# Patient Record
Sex: MALE | Race: WHITE
[De-identification: names, ages, dates, MRNs, and addresses within clinical notes are randomized per-mention and may not be internally consistent; named-entity substitution may affect disease eponyms.]

---

## 2023-05-31 ENCOUNTER — HOSPITAL ENCOUNTER (EMERGENCY)
Dept: HOSPITAL 97 - ER | Age: 23
Discharge: HOME | End: 2023-05-31
Payer: COMMERCIAL

## 2023-05-31 VITALS — SYSTOLIC BLOOD PRESSURE: 145 MMHG | OXYGEN SATURATION: 99 % | TEMPERATURE: 99 F | DIASTOLIC BLOOD PRESSURE: 89 MMHG

## 2023-05-31 DIAGNOSIS — R00.2: Primary | ICD-10-CM

## 2023-05-31 DIAGNOSIS — Z88.6: ICD-10-CM

## 2023-05-31 DIAGNOSIS — Z88.5: ICD-10-CM

## 2023-05-31 LAB
BUN BLD-MCNC: 16 MG/DL (ref 7–18)
GLUCOSE SERPLBLD-MCNC: 110 MG/DL (ref 74–106)
HCT VFR BLD CALC: 43.9 % (ref 39.6–49)
INR BLD: 1.01
LYMPHOCYTES # SPEC AUTO: 1.8 K/UL (ref 0.7–4.9)
MAGNESIUM SERPL-MCNC: 2.1 MG/DL (ref 1.6–2.4)
MCV RBC: 86.4 FL (ref 80–100)
METHAMPHET UR QL SCN: NEGATIVE
PMV BLD: 8.2 FL (ref 7.6–11.3)
POTASSIUM SERPL-SCNC: 3.9 MEQ/L (ref 3.5–5.1)
RBC # BLD: 5.08 M/UL (ref 4.33–5.43)
THC SERPL-MCNC: NEGATIVE NG/ML
TROPONIN I SERPL HS-MCNC: 5.7 PG/ML (ref ?–58.9)
TSH SERPL DL<=0.05 MIU/L-ACNC: 1.42 UIU/ML (ref 0.36–3.74)

## 2023-05-31 PROCEDURE — 84484 ASSAY OF TROPONIN QUANT: CPT

## 2023-05-31 PROCEDURE — 36415 COLL VENOUS BLD VENIPUNCTURE: CPT

## 2023-05-31 PROCEDURE — 80048 BASIC METABOLIC PNL TOTAL CA: CPT

## 2023-05-31 PROCEDURE — 80307 DRUG TEST PRSMV CHEM ANLYZR: CPT

## 2023-05-31 PROCEDURE — 71045 X-RAY EXAM CHEST 1 VIEW: CPT

## 2023-05-31 PROCEDURE — 93005 ELECTROCARDIOGRAM TRACING: CPT

## 2023-05-31 PROCEDURE — 84481 FREE ASSAY (FT-3): CPT

## 2023-05-31 PROCEDURE — 99283 EMERGENCY DEPT VISIT LOW MDM: CPT

## 2023-05-31 PROCEDURE — 84439 ASSAY OF FREE THYROXINE: CPT

## 2023-05-31 PROCEDURE — 85610 PROTHROMBIN TIME: CPT

## 2023-05-31 PROCEDURE — 83735 ASSAY OF MAGNESIUM: CPT

## 2023-05-31 PROCEDURE — 85379 FIBRIN DEGRADATION QUANT: CPT

## 2023-05-31 PROCEDURE — 85025 COMPLETE CBC W/AUTO DIFF WBC: CPT

## 2023-05-31 PROCEDURE — 84443 ASSAY THYROID STIM HORMONE: CPT

## 2023-05-31 NOTE — RAD REPORT
EXAM DESCRIPTION:  [place] Single View5/31/2023 3:40 pm

 

CLINICAL HISTORY:  PALPITATIONS

 

COMPARISON:  CHEST PA AND LAT 2 VIEW dated 12/15/2015

 

TECHNIQUE:   Portable AP view of the chest.

 

FINDINGS:  The lungs show no focal consolidation. Streaky opacities at the right apex, with suggestio
n of a suture line, may relate to postoperative changes.  No pneumothorax or effusion. The cardiomedi
astinal contours are unremarkable.

 

IMPRESSION:  No acute cardiopulmonary process.

## 2023-05-31 NOTE — ER
Nurse's Notes                                                                                     

 [facility]                                                                 

Name: José Miguel Garcia                                                                               

Age: 22 yrs                                                                                       

Sex: Male                                                                                         

: 2000                                                                                   

MRN: [id number]                                                                                   

Arrival Date: 2023                                                                          

Time: 14:57                                                                                       

Account#: [id number]                                                                            

Bed 12                                                                                            

Private MD: Sergei Vazquez                                                                         

Diagnosis: Palpitations                                                                           

                                                                                                  

Presentation:                                                                                     

                                                                                             

15:04 Chief complaint: Patient states: "I've had heart palpitations on and off for the past   mb9 

      week at night. I don't have SOB or chest pain. This has happened a couple times over        

      the past 5 or 6 years. I have an irregular-regular heart beat". Coronavirus screen:         

      Vaccine status:. Coronavirus screen: Vaccine status: Patient reports receiving the 2nd      

      dose of the covid vaccine. Ebola Screen: No symptoms or risks identified at this time.      

      Initial Sepsis Screen: Does the patient meet any 2 criteria? No. Patient's initial          

      sepsis screen is negative. Does the patient have a suspected source of infection? No.       

      Patient's initial sepsis screen is negative. Risk Assessment: Do you want to hurt           

      yourself or someone else? Patient reports no desire to harm self or others. Onset of        

      symptoms was May 31, 2023.                                                                  

15:04 Method Of Arrival: Ambulatory                                                           mb9 

15:04 Acuity: TRACEY 3                                                                           mb9 

                                                                                                  

Triage Assessment:                                                                                

15:09 General: Appears in no apparent distress. Behavior is anxious. Pain: Denies pain.       mb9 

      Neuro: Ch Agitation-Sedation Scale (RASS): 0 - Alert and Calm Level of                

      Consciousness is awake, alert, obeys commands, Oriented to person, place, time,             

      situation, Appropriate for age. Cardiovascular: Denies chest pain, nausea,                  

      palpitations, shortness of breath. Respiratory: Airway is patent Respiratory effort is      

      even, unlabored, Respiratory pattern is regular, symmetrical. Derm: Skin is pink, warm      

      \T\ dry. Musculoskeletal: Range of motion: intact in all extremities.                       

                                                                                                  

Historical:                                                                                       

- Allergies:                                                                                      

15:07 Morphine;                                                                               mb9 

15:07 Norco;                                                                                  mb9 

15:07 Ibuprofen;                                                                              mb9 

- Home Meds:                                                                                      

15:07 None [Active];                                                                          mb9 

- PMHx:                                                                                           

15:07 Spontaneous pneumothorax X 8;                                                           mb9 

- PSHx:                                                                                           

15:07 Chest tubes;                                                                            mb9 

                                                                                                  

- Immunization history:: Adult Immunizations up to date.                                          

- Social history:: Smoking status: Patient denies any tobacco usage or history of.                

                                                                                                  

                                                                                                  

Screenin:04 [facility] ED Fall Risk Assessment (Adult) History of falling in the last 3 months,       iw  

      including since admission. Abuse screen: Denies threats or abuse. Denies injuries from      

      another. Nutritional screening: No deficits noted. Tuberculosis screening: No symptoms      

      or risk factors identified.                                                                 

                                                                                                  

Assessment:                                                                                       

15:09 Reassessment: pt denies caffenine use. Pt states he started taking Bucked up pre        mb9 

      workout 1 month ago. Denies any use today.                                                  

16:04 Reassessment: Patient appears in no apparent distress at this time. Patient and/or      iw  

      family updated on plan of care and expected duration. Pain level reassessed. Patient is     

      alert, oriented x 3, equal unlabored respirations, skin warm/dry/pink.                      

                                                                                                  

Vital Signs:                                                                                      

15:04  / 89; Pulse 81; Resp 18; Temp 99(O); Pulse Ox 99% on R/A; Weight 83.91 kg;       mb9 

      Height 5 ft. 11 in. ;                                                                       

15:04 Body Mass Index 25.80 (83.91 kg, 180.34 cm)                                             mb9 

                                                                                                  

ED Course:                                                                                        

15:00 Patient arrived in ED.                                                                  mr  

15:01 Sergei Vazquez,  is Private Physician.                                                 mr  

15:02 [facility]                                                                cp  

15:03 Pablo Gentile MD is Attending Physician.                                              cp  

15:03 Pablo Gentile MD is Attending Physician.                                              cp  

15:04 Arm band placed on.                                                                     mb9 

15:07 Triage completed.                                                                       mb9 

15:23 Basic Metabolic Panel Sent.                                                             bc6 

15:23 CBC with Diff Sent.                                                                     bc6 

15:23 D-Dimer Sent.                                                                           bc6 

15:23 Magnesium Sent.                                                                         bc6 

15:23 PT-INR Sent.                                                                            bc6 

15:23 Troponin HS Sent.                                                                       bc6 

15:23 T4 Free Sent.                                                                           bc6 

15:23 T3 Free Sent.                                                                           bc6 

15:23 TSH Sent.                                                                               bc6 

15:24 Inserted saline lock: 20 gauge in left antecubital area, using aseptic technique.       bc6 

15:42 XRAY Chest (1 view) In Process Unspecified.                                             EDMS

16:04 Heidi Peters, RN is Primary Nurse.                                                   iw  

16:30 Patient has correct armband on for positive identification.                             iw  

16:36 Jaron Diana MD is Referral Physician.                                               cp  

                                                                                                  

Administered Medications:                                                                         

No medications were administered                                                                  

                                                                                                  

                                                                                                  

Medication:                                                                                       

16:30 VIS not applicable for this client.                                                     iw  

                                                                                                  

Outcome:                                                                                          

16:37 Discharge ordered by MD. perea  

17:11 Patient left the ED.                                                                    iw  

                                                                                                  

Signatures:                                                                                       

Dispatcher [employer]                           Marie Hernandez Irene RN                     RN   Jim Hardwick PA PA cp Breneman, Mary Beth, RN                 RN   mb9                                                  

Alka Mora                           bc6                                                  

                                                                                                  

**************************************************************************************************

## 2023-05-31 NOTE — EDPHYS
Physician Documentation                                                                           

 [facility]                                                                 

Name: José Miguel Garcia                                                                               

Age: 22 yrs                                                                                       

Sex: Male                                                                                         

: 2000                                                                                   

MRN: [id number]                                                                                   

Arrival Date: 2023                                                                          

Time: 14:57                                                                                       

Account#: [id number]                                                                            

Bed 12                                                                                            

Private MD: [facility]                                                                         

ED Physician Pablo Gentile                                                                       

HPI:                                                                                              

                                                                                             

15:12 This 22 yrs old Male presents to ER via Ambulatory with complaints of Palpitations.     cp  

15:12 The patient presents with a history of irregular heart beat, heart racing.              cp  

15:12 Context: The symptoms occur at rest, while laying in bed at night.                      cp  

15:12 Onset: The symptoms/episode began/occurred 1 week(s) ago. Duration: The patient or      cp  

      guardian reports multiple episodes, that are intermittent. Associated signs and             

      symptoms: Pertinent negatives: chest pain, cough, fever, SOB, syncope, vomiting.            

      Severity of symptoms: in the emergency department the symptoms have improved markedly.      

                                                                                                  

Historical:                                                                                       

- Allergies:                                                                                      

15:07 Morphine;                                                                               mb9 

15:07 Norco;                                                                                  mb9 

15:07 Ibuprofen;                                                                              mb9 

- Home Meds:                                                                                      

15:07 None [Active];                                                                          mb9 

- PMHx:                                                                                           

15:07 Spontaneous pneumothorax X 8;                                                           mb9 

- PSHx:                                                                                           

15:07 Chest tubes;                                                                            mb9 

                                                                                                  

- Immunization history:: Adult Immunizations up to date.                                          

- Social history:: Smoking status: Patient denies any tobacco usage or history of.                

                                                                                                  

                                                                                                  

ROS:                                                                                              

15:15 Constitutional: Negative for body aches, chills, fever, poor PO intake.                 cp  

15:15 Eyes: Negative for injury, pain, redness, and discharge.                                cp  

15:15 ENT: Negative for drainage from ear(s), ear pain, sore throat, difficulty swallowing,   cp  

      difficulty handling secretions.                                                             

15:15 Cardiovascular: Positive for palpitations, Negative for chest pain, edema.                  

15:15 Respiratory: Negative for cough, shortness of breath, wheezing.                             

15:15 Abdomen/GI: Negative for abdominal pain, nausea, vomiting, and diarrhea.                    

15:15 Neuro: Negative for altered mental status, dizziness, headache, weakness.                   

15:15 All other systems are negative.                                                             

                                                                                                  

Exam:                                                                                             

15:20 ECG was reviewed by the Attending Physician.                                            cp  

15:21 Constitutional: The patient appears in no acute distress, alert, awake, comfortable,    cp  

      non-toxic, well developed, well nourished.                                                  

15:21 Head/Face:  Normocephalic, atraumatic.                                                  cp  

15:21 Eyes: Periorbital structures: appear normal, Conjunctiva: normal, no exudate, no        cp  

      injection, Sclera: no appreciated abnormality, Lids and lashes: appear normal,              

      bilaterally.                                                                                

15:21 ENT: External ear(s): are unremarkable, Nose: is normal, Mouth: Lips: moist, Oral           

      mucosa: pink and intact, moist, Posterior pharynx: is normal, airway is patent, no          

      erythema, no exudate.                                                                       

15:21 Neck: ROM/movement: is normal, is supple, without pain, no range of motions limitations.    

15:21 Chest/axilla: Inspection: normal.                                                           

15:21 Cardiovascular: Rate: normal, Rhythm: regular, Heart sounds: murmur, not appreciated,       

      Edema: is not appreciated, JVD: is not appreciated.                                         

15:21 Respiratory: the patient does not display signs of respiratory distress,  Respirations:     

      normal, no use of accessory muscles, no retractions, labored breathing, is not present,     

      Breath sounds: are clear throughout, no decreased breath sounds, no stridor, no             

      wheezing.                                                                                   

15:21 Abdomen/GI: Inspection: abdomen appears normal, Bowel sounds: active, all quadrants,        

      Palpation: abdomen is soft and non-tender, in all quadrants.                                

15:21 Back: pain, is absent, ROM is normal.                                                       

15:21 Neuro: Orientation: to person, place \T\ time. Mentation: is normal, Motor: moves all       

      fours, strength is normal, Sensation: is normal, Gait: is steady, at a normal pace,         

      without difficulty.                                                                         

                                                                                                  

Vital Signs:                                                                                      

15:04  / 89; Pulse 81; Resp 18; Temp 99(O); Pulse Ox 99% on R/A; Weight 83.91 kg;       mb9 

      Height 5 ft. 11 in. ;                                                                       

15:04 Body Mass Index 25.80 (83.91 kg, 180.34 cm)                                             mb9 

                                                                                                  

MDM:                                                                                              

15:11 Patient medically screened.                                                             cp  

15:30 Differential diagnosis: arrythmia, dehydration, stress disorder, anxiety, electrolyte   cp  

      abnormality.                                                                                

16:36 Data reviewed: vital signs, nurses notes, lab test result(s), EKG, radiologic studies,  cp  

      plain films.                                                                                

16:36 Independent interpretation of the following test(s) in the Emergency Department EKG:    cp  

      See my EKG interpretation above X-Ray: My interpretation is chest image negative for        

      infiltrates. Test considered but Not performed: CT: chest. Counseling: I had a detailed     

      discussion with the patient and/or guardian regarding: the historical points, exam          

      findings, and any diagnostic results supporting the discharge/admit diagnosis, lab          

      results, radiology results, the need for outpatient follow up, a cardiologist, to           

      return to the emergency department if symptoms worsen or persist or if there are any        

      questions or concerns that arise at home.                                                   

                                                                                                  

                                                                                             

15:10 Order name: Basic Metabolic Panel; Complete Time: 16:04                                   

                                                                                             

16:04 Interpretation: Normal except: ; GLUC 110.                                          

                                                                                             

15:10 Order name: CBC with Diff; Complete Time: 16:23                                           

                                                                                             

16:23 Interpretation: Reviewed.                                                                 

                                                                                             

15:10 Order name: D-Dimer; Complete Time: 16:23                                                 

                                                                                             

16:24 Interpretation: Reviewed.                                                                 

                                                                                             

15:10 Order name: Magnesium; Complete Time: 16:04                                               

                                                                                             

15:10 Order name: PT-INR; Complete Time: 16:23                                                  

                                                                                             

16:24 Interpretation: Reviewed.                                                                 

                                                                                             

15:10 Order name: Troponin HS; Complete Time: 16:04                                             

                                                                                             

16:24 Interpretation: Reviewed.                                                                 

                                                                                             

15:10 Order name: TSH; Complete Time: 16:04                                                     

                                                                                             

15:10 Order name: UDS; Complete Time: 16:29                                                     

                                                                                             

15:10 Order name: T3 Free; Complete Time: 16:04                                                 

                                                                                             

15:10 Order name: T4 Free; Complete Time: 16:04                                                 

                                                                                             

15:10 Order name: XRAY Chest (1 view); Complete Time: 16:04                                     

                                                                                             

16:04 Interpretation: Report review.                                                            

                                                                                             

15:10 Order name: EKG; Complete Time: 15:11                                                     

                                                                                             

15:10 Order name: Cardiac monitoring; Complete Time: 16:42                                      

                                                                                             

15:10 Order name: EKG - Nurse/Tech; Complete Time: 15:17                                        

                                                                                             

15:10 Order name: IV Saline Lock; Complete Time: 15:17                                          

                                                                                             

15:10 Order name: Labs collected and sent; Complete Time: 15:17                                 

                                                                                             

15:10 Order name: O2 Per Protocol; Complete Time: 16:02                                       cp  

                                                                                             

15:10 Order name: O2 Sat Monitoring; Complete Time: 16:02                                     cp  

                                                                                                  

ECG:                                                                                              

15:20 Rate is 85 beats/min. Rhythm is regular. [place] interval is normal. QRS interval is normal. cp  

      QT interval is normal. T waves are Inverted in lead aVR. Interpreted by me. Reviewed by     

      me.                                                                                         

                                                                                                  

Administered Medications:                                                                         

No medications were administered                                                                  

                                                                                                  

                                                                                                  

Disposition Summary:                                                                              

23 16:37                                                                                    

Discharge Ordered                                                                                 

      Location: Home                                                                          cp  

      Problem: new                                                                            cp  

      Symptoms: have improved                                                                 cp  

      Condition: Stable                                                                       cp  

      Diagnosis                                                                                   

        - Palpitations                                                                        cp  

      Followup:                                                                               cp  

        - With: Jaron Diana MD                                                                 

        - When: 2 - 3 days                                                                         

        - Reason: Recheck today's complaints                                                       

      Discharge Instructions:                                                                     

        - Discharge Summary Sheet                                                             cp  

        - Palpitations                                                                        cp  

        - Ambulatory Cardiac Monitoring                                                       cp  

      Forms:                                                                                      

        - Work release form                                                                   iw  

        - Medication Reconciliation Form                                                      cp  

        - Thank You Letter                                                                    cp  

        - Antibiotic Education                                                                cp  

        - Prescription Opioid Use                                                             cp  

Signatures:                                                                                       

Dispatcher [employer]                           EDJim Buchanan PA PA cp Breneman, Mary Beth, RN                 RN   mb9                                                  

                                                                                                  

**************************************************************************************************

## 2023-05-31 NOTE — XMS REPORT
Continuity of Care Document

                             Created on:May 31, 2023



Patient:BROCK URIAS

Sex:Male

:2000

External Reference #:[id number]





Demographics







                          Address                   [address] [place] [address]

 

                          Home Phone                [phone]

 

                          Work Phone                [phone]

 

                          Mobile Phone              [phone]

 

                          Email Address             [email or web address]

 

                          Preferred Language        en

 

                          Marital Status            Unknown

 

                          Caodaism Affiliation     Unknown

 

                          Race                      Unknown

 

                          Ethnic Group              Unknown









Author







                          Organization              [facility]

t

 

                          Address                   [address]



                                                    [place] [address]

 

                          Phone                     [phone]









Support







                Name            Relationship    Address         Phone

 

                Brock Urias  Unavailable     [address]    [phone]



                                                [place] [address] 

 

                BROCK URIAS  Unavailable     [address]     [phone]



                                                [place] [address] 









Care Team Providers







                    Name                Role                Phone

 

                    Sergei Vazquez     Attending Clinician Unavailable









Payers







           Payer Name Policy Type Policy Number Effective Date Expiration Date S

laura

 

           AETNA      53         [id number]  2021            [facility] 

-



                                            00:00:00              [facility]

 

           AETNA      53         [id number]            [facility] 

-



                                            00:00:00              [facility]

 

           AETNA      53         [id number]  2021-02-10            [facility] 

-



                                            00:00:00              [facility]







Problems







       Condition Condition Condition Status Onset  Resolution Last   Treating Co

mments 

Source



       Name   Details Category        Date   Date   Treatment Clinician        



                                                 Date                 

 

       [id number] GERD   Problem                                           [facility]



              without                                                  [facility]



              esophagiti                                                  - [facility]



              s                                                       [facility]

 

       [id number] Pain in Problem                                           Comm

on



       93752  left lower                                                  [facility]



              leg                                                     [facility]

 

       [id number] Mixed  Problem                                           [facility]



              hyperlipid                                                  [facility]



              emia                                                    [facility]

 

       [id number] Leukocytos Problem                                           Co

mmon



              is,                                                     [facility]



              unspecifie                                                  - [facility]



              d type                                                  [facility]







Allergies, Adverse Reactions, Alerts







       Allergy Allergy Status Severity Reaction(s) Onset  Inactive Treating Comm

ents 

Source



       Name   Type                        Date   Date   Clinician        

 

       morphine morphine Active        Rash                               [facility]



                                                                      [facility]



                                                                      [facility]

 

       65     Drug   Active        Nausea,                             [facility]



              allergy               upset                              [facility]



                                   stomach                             [facility]







Social History







           Social Habit Start Date Stop Date  Quantity   Comments   Source

 

           History of Tobacco Use                                             Co

mmon [facility]

 

           Sex Assigned At Birth                                             [facility]









                Smoking Status  Start Date      Stop Date       Source

 

                Never Smoker                                    [facility]







Medications







       Ordered Filled Start  Stop   Current Ordering Indication Dosage Frequency

 Signature

                    Comments            Components          Source



     Medication Medication Date Date Medication? Clinician                (SIG) 

          



     Name Name                                                   

 

     Melva Pereirayci -0 2- No                  QD   Azithromyc   

        



     n 250 MG n 250 MG                           in 250 MG           



               00:00: 00:00                                         



               00   :00                                          

 

     Azithromyci Azithromyci -0 2- No                  QD   Azithromyc   

        



     n 250 MG n 250 MG                           in 250 MG           



               00:00: 00:00                                         



               00   :00                                          

 

     Azithromyci Azithromyci -0 2- No                  QD   Azithromyc   

        



     n 250 MG n 250 MG                           in 250 MG           



               00:00: 00:00                                         



               00   :00                                          

 

     Azithromyci Azithromyci -0 2- No                  QD   Azithromyc   

        



     n 250 MG n 250 MG                           in 250 MG           



               00:00: 00:00                                         



               00   :00                                          

 

     Benzonatate Benzonatate -0 2- No             1{capsu      Benzonatat

           



     200  MG 9-21 10-05                le_as_n      e 200 MG           



               00:00: 00:00                eeded}                     



               00   :00                                          

 

     Benzonatate Benzonatate -0 2- No             1{capsu      Benzonatat

           



     200  MG 9-21 10-                le_as_n      e 200 MG           



               00:00: 00:00                eeded}                     



               00   :00                                          

 

     Benzonatate Benzonatate -0 2- No             1{capsu      Benzonatat

           



     200  MG 9-21 10-05                le_as_n      e 200 MG           



               00:00: 00:00                eeded}                     



               00   :00                                          

 

     Benzonatate Benzonatate 2-0 2- No             1{capsu      Benzonatat

           



     200  MG 9-21 10-05                le_as_n      e 200 MG           



               00:00: 00:00                eeded}                     



               00   :00                                          

 

     Benzonatate Benzonatate 2-0 2022- No             1{capsu      Benzonatat

           



     200  MG 9-21 10-                le_as_n      e 200 MG           



               00:00: 00:00                eeded}                     



               00   :00                                          

 

     Benzonatate Benzonatate 2022- No             1{capsu      Benzonatat

           



     200  MG 9-21 10-05                le_as_n      e 200 MG           



               00:00: 00:00                eeded}                     



               00   :00                                          

 

     Benzonatate Benzonatate 2022- No             1{capsu      Benzonatat

           



     200  MG 9-21 10-05                le_as_n      e 200 MG           



               00:00: 00:00                eeded}                     



               00   :00                                          

 

     Benzonatate Benzonatate 2022- No             1{capsu      Benzonatat

           



     200  MG 9-21 10-05                le_as_n      e 200 MG           



               00:00: 00:00                eeded}                     



               00   :00                                          

 

     methylPREDN methylPREDN 2022- No                  QD   methylPRED   

        



     ISolone 4 ISolone 4                           NISolone 4         

  



     MG   MG   00:00: 00:00                          MG             



               00   :00                                          

 

     methylPREDN methylPREDN -0 - No                  QD   methylPRED   

        



     ISolone 4 ISolone 4                           NISolone 4         

  



     MG   MG   00:00: 00:00                          MG             



               00   :00                                          

 

     methylPREDN methylPREDN -0 - No                  QD   methylPRED   

        



     ISolone 4 ISolone 4                           NISolone 4         

  



     MG   MG   00:00: 00:00                          MG             



               00   :00                                          

 

     methylPREDN methylPREDN -0 - No                  QD   methylPRED   

        



     ISolone 4 ISolone 4                           NISolone 4         

  



     MG   MG   00:00: 00:00                          MG             



               00   :00                                          

 

     No Known No Known           No                                      Common



     Medications Medications                                                   S

[facility]

 

     No Known No Known           No                                      [facility]



     Medications Medications                                                   S

[facility]







Immunizations







           Ordered Immunization Filled Immunization Date       Status     Commen

ts   Source



           Name       Name                                        

 

           Flucelvax - single Flucelvax - single 2022 Completed           

  [facility]



           dose syringe dose syringe 16:04:00                         - [facility]

 

           Flucelvax - single Flucelvax - single 2022 Completed           

  [facility]



           dose syringe dose syringe 16:04:00                         - [facility]

 

           Flucelvax - single Flucelvax - single 2022 Completed           

  [facility]



           dose syringe dose syringe 16:04:00                         - [facility]

 

           Flucelvax - single Flucelvax - single 2022 Completed           

  [facility]



           dose syringe dose syringe 16:04:00                         - [facility]

 

           Flucelvax - single Flucelvax - single 2022 Completed           

  [facility]



           dose syringe dose syringe 16:04:00                         - [facility]

 

           Flucelvax - single Flucelvax - single 2022 Completed           

  [facility]



           dose syringe dose syringe 16:04:00                         - [facility]

 

           Flucelvax - single Flucelvax - single 2022 Completed           

  [facility]



           dose syringe dose syringe 16:04:00                         - [facility]







Vital Signs







             Vital Name   Observation Time Observation Value Comments     Source

 

             height       2022 08:15:00 72 [in_i]                 [facility]

 

             weight       2022 08:15:00 179 [lb_av]               [facility]

 

             temperature  2022 08:15:00 97.6 [degF]               [facility]

 

             bmi          2022 08:15:00 24.27 kg/m2               [facility]

 

             blood pressure 2022 08:15:00 130 mm[Hg]                [facility]



             systolic                                            [facility]

 

             blood pressure 2022 08:15:00 72 mm[Hg]                 [facility]



             diastolic                                           [facility]

 

             blood pressure 2022 08:30:00 126 mm[Hg]                [facility]



             systolic                                            [facility]

 

             blood pressure 2022 08:30:00 76 mm[Hg]                 [facility]



             diastolic                                           [facility]

 

             height       2022 08:30:00 72 [in_i]                 [facility]

 

             weight       2022 08:30:00 179 [lb_av]               [facility]

 

             temperature  2022 08:30:00 97.4 [degF]               [facility]

 

             bmi          2022 08:30:00 24.27 kg/m2               Common S

[facility]

 

             height       2022-10-25 16:20:00 72 [in_i]                 [facility]

 

             weight       2022-10-25 16:20:00 169 [lb_av]               [facility]

 

             bmi          2022-10-25 16:20:00 22.92 kg/m2               [facility]

 

             height       2022 16:10:00 72 [in_i]                 Common S

[facility]

 

             weight       2022 16:10:00 169.5 [lb_av]              [facility]

 

             temperature  2022 16:10:00 97.7 [degF]               [facility]

 

             bmi          2022 16:10:00 22.99 kg/m2               [facility]

 

             oximetry     2022 16:10:00 97 %                      [facility]

 

             respiratory rate 2022 16:10:00 18 /min                   Comm

[facility]

 

             blood pressure 2022 16:10:00 125 mm[Hg]                [facility]



             systolic                                            [facility]

 

             blood pressure 2022 16:10:00 71 mm[Hg]                 [facility]



             diastolic                                           [facility]

 

             height       2022 16:30:00 72 [in_i]                 [facility]

 

             weight       2022 16:30:00 170 [lb_av]               [facility]

 

             bmi          2022 16:30:00 23.05 kg/m2               [facility]







Procedures

This patient has no known procedures.



Encounters







        Start   End     Encounter Admission Attending Care    Care    Encounter 

Source



        Date/Time Date/Time Type    Type    Clinicians Facility Department ID   

   

 

        2022         Outpatient         VazquezJOE de leon  [facility]  [id number]

 [facility]



        09:48:01                         [facility]                  [id number]   [facility]

 

        2022-10-21         Outpatient         [facility]  [facility]  [id number]

 [facility]



        10:36:01                         Sergei                     [facility]

 

        2022-10-04         Outpatient         Vazquez,  [facility]  [facility]  [id number]

 [facility]



        08:08:03                         Sergei                     [facility]

 

        2022         Outpatient         Vazquez,  [facility]  [facility]  [id number]

 [facility]



        10:50:01                         Sergei                     [facility]

 

        2022         Outpatient         Vazquez,  [facility]  [facility]  [id number]

 [facility]



        16:15:00                         Sergei                     [facility]

 

        2022         Outpatient         Vazquez,  [facility]  [facility]  [id number]

 [facility]



        12:44:53                         Sergei                     [facility]

 

        2022         Outpatient         Vazquez,  [facility]  [facility]  [id number]

 [facility]



        12:39:16                         Sergei                  [id number]   [facility]

 

        2022         Outpatient         Vazquez,  [facility]  [facility]  [id number]

 [facility]



        12:38:11                         Sergei                  [id number]   [facility]

 

        2022         Outpatient         Vazquez,  [facility]  [facility]  [id number]

 [facility]



        12:30:28                         Sergei                  [id number]   [facility]

 

        2022 OFFICE                  [facility]  [facility]  [id number] Co

mmon



        00:00:00 00:00:00 VISIT EST                                         [facility]

it



                        PT LEVEL 3                                         [facility]

 

        2022 CONSULT -                 [facility]  [facility]  [id number] 

Common



        00:00:00 00:00:00 OFFICE, L3                                         Spi

rit



                                                                        [facility]

 

        2022-10-25 2022-10-25 OFFICE                  [facility]  [facility]  [id number] Co

mmon



        00:00:00 00:00:00 VISIT EST                                         [facility]

it



                        PT LEVEL 3                                         [facility]

 

        2022 PREV VISIT                 [facility]  [facility]  [id number]

 Common



        00:00:00 00:00:00 EST AGE                                         [facility]



                        18-39                                           [facility]

 

        2022 (TEL)                   [facility]  [facility]  [id number] Co

mmon



        00:00:00 00:00:00                                                 [facility]

 

        2022 [facility]TEL[facility]                   [facility]  [id number] Co

mmon



        00:00:00 00:00:00                                                 [facility]

 

        2022 OFFICE                  [facility]  [id number] Co

mmon



        00:00:00 00:00:00 VISIT EST                                         [facility]

it



                        PT LEVEL 3                                         - [facility]

 

        2021 Outpatient                 [facility]  [id number]

 [facility]



        00:00:00 00:00:00                                                 [facility]

 

        2021 Outpatient                 [facility]  [id number]

 [facility]



        00:00:00 00:00:00                                                 [facility]







Results







           Test Description Test Time  Test Comments Results    Result Comments 

Source









                    Lipid Panel With LDL/HDL Ratio 2022 00:00:00 









                      Test Item  Value      Reference Range Interpretation Comme

nts









             Cholesterol, Total (test code 189 mg/dL    See_Comment             

   [Automated message] The 

system



             = -3)                                           which generated

 this result



                                                                 transmitted ref

erence range:



                                                                 100-199 mg/dL. 

The reference



                                                                 range was not u

sed to interpret



                                                                 this result as 

normal/abnormal.

 

             Triglycerides (test code = 155 mg/dL    See_Comment  H             

[Automated message] The 

system



             9898-4)                                             which generated

 this result



                                                                 transmitted ref

erence range:



                                                                 0-149 mg/dL. Th

e reference range



                                                                 was not used to

 interpret this



                                                                 result as willow

l/abnormal.

 

             HDL Cholesterol (test code = 40 mg/dL     See_Comment              

  [Automated message] The 

system



             89)                                             which generated

 this result



                                                                 transmitted ref

erence range: >39



                                                                 mg/dL. The refe

rence range was



                                                                 not used to int

erpret this



                                                                 result as willow

l/abnormal.



UA/M w/rflx Culture, [id number] 00:00:00





             Test Item    Value        Reference Range Interpretation Comments

 

             Specific Gravity (test 1.028        1.005-1.030               



             code = 2965-2)                                        

 

             pH (test code = 6.5          5.0-7.5                   



             5803-2)                                             

 

             Urine-Color (test code Yellow       Yellow                    



             = 5778-6)                                           

 

             Appearance (test code Clear        Clear                     



             = 5767-9)                                           

 

             WBC Esterase (test Negative     Negative                  



             code = 5799-2)                                        

 

             Protein (test code = 1+           Negative/Trace A            



             38895-2)                                            

 

             Glucose (test code = Negative     Negative                  



             2349-9)                                             

 

             Ketones (test code = Negative     Negative                  



             2514-8)                                             

 

             Occult Blood (test Negative     Negative                  



             code = 5794-3)                                        

 

             Bilirubin (test code = Negative     Negative                  



             5770-3)                                             

 

             Urobilinogen,Semi-Qn 0.2 mg/dL    See_Comment                [Autom

ated message]



             (test code = 00705-0)                                        The sy

stem which



                                                                 generated this 

result



                                                                 transmitted ref

erence



                                                                 range: 0.2-1.0 

mg/dL.



                                                                 The reference r

skye



                                                                 was not used to



                                                                 interpret this 

result



                                                                 as normal/abnor

mal.

 

             Nitrite, Urine (test Negative     Negative                  



             code = 5802-4)                                        

 

             Microscopic  See below:                             



             Examination (test code                                        



             = 93453-5)                                          

 

             Urinalysis Reflex                                        



             (test code = UNLOINC)                                        



Hemoglobin [id number] 00:00:00





             Test Item    Value        Reference Range Interpretation Comments

 

             Hemoglobin A1c (test 5.1 %        See_Comment                [Autom

ated message] The



             code = 4548-4)                                        system which 

generated



                                                                 this result tra

nsmitted



                                                                 reference range

: 4.8-5.6



                                                                 %. The referenc

e range



                                                                 was not used to



                                                                 interpret this 

result as



                                                                 normal/abnormal

.



Comp. Metabolic Panel (14) (CMP)2022 00:00:00





             Test Item    Value        Reference Range Interpretation Comments

 

             Glucose (test code = 96 mg/dL     See_Comment                [Autom

ated message]



             2345-7)                                             The system [employer]



                                                                 generated this 

result



                                                                 transmitted ref

erence



                                                                 range: 70-99 mg

/dL.



                                                                 The reference r

skye



                                                                 was not used to



                                                                 interpret this 

result



                                                                 as normal/abnor

mal.

 

             BUN (test code = 13 mg/dL     See_Comment                [Automated

 message]



             3094-0)                                             The system [employer]



                                                                 generated this 

result



                                                                 transmitted ref

erence



                                                                 range: 6-20 mg/

dL.



                                                                 The reference r

skye



                                                                 was not used to



                                                                 interpret this 

result



                                                                 as normal/abnor

mal.

 

             Creatinine (test code 0.90 mg/dL   See_Comment                [Auto

mated message]



             = 2160-0)                                           The system [employer]



                                                                 generated this 

result



                                                                 transmitted ref

erence



                                                                 range: 0.76-1.2

7



                                                                 mg/dL. The refe

rence



                                                                 range was not u

sed to



                                                                 interpret this 

result



                                                                 as normal/abnor

mal.

 

             BUN/Creatinine Ratio 14           9-20                      



             (test code = 3097-3)                                        

 

             Sodium (test code = 139 mmol/L   See_Comment                [Automa

moncho message]



             2951-2)                                             The system [facility]



                                                                 generated this 

result



                                                                 transmitted ref

erence



                                                                 range: 134-144



                                                                 mmol/L. The ref

erence



                                                                 range was not u

sed to



                                                                 interpret this 

result



                                                                 as normal/abnor

mal.

 

             Potassium (test code = 4.4 mmol/L   See_Comment                [Aut

omated message]



             1863-3)                                             The system [facility]



                                                                 generated this 

result



                                                                 transmitted ref

erence



                                                                 range: 3.5-5.2



                                                                 mmol/L. The ref

erence



                                                                 range was not u

sed to



                                                                 interpret this 

result



                                                                 as normal/abnor

mal.

 

             Chloride (test code = 99 mmol/L    See_Comment                [Auto

mated message]



             5-0)                                             The system [facility]



                                                                 generated this 

result



                                                                 transmitted ref

erence



                                                                 range:  m

mol/L.



                                                                 The reference r

skye



                                                                 was not used to



                                                                 interpret this 

result



                                                                 as normal/abnor

mal.

 

             Carbon Dioxide, Total 25 mmol/L    See_Comment                [Auto

mated message]



             (test code = -9)                                        The s

tem which



                                                                 generated this 

result



                                                                 transmitted ref

erence



                                                                 range: 20-29 mm

ol/L.



                                                                 The reference r

skye



                                                                 was not used to



                                                                 interpret this 

result



                                                                 as normal/abnor

mal.

 

             Calcium (test code = 10.0 mg/dL   See_Comment                [Autom

ated message]



             09496-0)                                            The system [facility]



                                                                 generated this 

result



                                                                 transmitted ref

erence



                                                                 range: 8.7-10.2



                                                                 mg/dL. The refe

rence



                                                                 range was not u

sed to



                                                                 interpret this 

result



                                                                 as normal/abnor

mal.

 

             Protein, Total (test 7.5 g/dL     See_Comment                [Autom

ated message]



             code = 0925-2)                                        The system [facility]



                                                                 generated this 

result



                                                                 transmitted ref

erence



                                                                 range: 6.0-8.5 

g/dL.



                                                                 The reference r

skye



                                                                 was not used to



                                                                 interpret this 

result



                                                                 as normal/abnor

mal.

 

             Albumin (test code = 5.2 g/dL     See_Comment                [Autom

ated message]



             2141-7)                                             The system [facility]



                                                                 generated this 

result



                                                                 transmitted ref

erence



                                                                 range: 4.1-5.2 

g/dL.



                                                                 The reference r

skye



                                                                 was not used to



                                                                 interpret this 

result



                                                                 as normal/abnor

mal.

 

             Globulin, Total (test 2.3 g/dL     See_Comment                [Auto

mated message]



             code = 07341-3)                                        The system [facility]



                                                                 generated this 

result



                                                                 transmitted ref

erence



                                                                 range: 1.5-4.5 

g/dL.



                                                                 The reference r

skye



                                                                 was not used to



                                                                 interpret this 

result



                                                                 as normal/abnor

mal.

 

             A/G Ratio (test code = 2.3          1.2-2.2      H            



             1759-0)                                             

 

             Bilirubin, Total (test 0.6 mg/dL    See_Comment                [Aut

omated message]



             code = -2)                                        The system [facility]



                                                                 generated this 

result



                                                                 transmitted ref

erence



                                                                 range: 0.0-1.2 

mg/dL.



                                                                 The reference r

skye



                                                                 was not used to



                                                                 interpret this 

result



                                                                 as normal/abnor

mal.

 

             Alkaline Phosphatase 84 IU/L      See_Comment                [Autom

ated message]



             (test code = 6768-6)                                        The sys

tem which



                                                                 generated this 

result



                                                                 transmitted ref

erence



                                                                 range:  I

U/L.



                                                                 The reference r

skye



                                                                 was not used to



                                                                 interpret this 

result



                                                                 as normal/abnor

mal.

 

             AST (SGOT) (test code 26 IU/L      See_Comment                [Auto

mated message]



             = 1920-8)                                           The system [facility]



                                                                 generated this 

result



                                                                 transmitted ref

erence



                                                                 range: 0-40 IU/

L. The



                                                                 reference range

 was



                                                                 not used to int

erpret



                                                                 this result as



                                                                 normal/abnormal

.

 

             ALT (SGPT) (test code 31 IU/L      See_Comment                [Auto

mated message]



             = 1742-6)                                           The system [facility]



                                                                 generated this 

result



                                                                 transmitted ref

erence



                                                                 range: 0-44 IU/

L. The



                                                                 reference range

 was



                                                                 not used to int

erpret



                                                                 this result as



                                                                 normal/abnormal

.



CBC With Differential/[id number] 00:00:00





             Test Item    Value        Reference Range Interpretation Comments

 

             WBC (test code = 14.9 x10E3/uL See_Comment  H             [Automate

d



             8190-2)                                             message] The sy

stem



                                                                 which generated



                                                                 this result



                                                                 transmitted



                                                                 reference range

:



                                                                 3.4-10.8 x10E3/

uL.



                                                                 The reference r

skye



                                                                 was not used to



                                                                 interpret this



                                                                 result as



                                                                 normal/abnormal

.

 

             RBC (test code = 5.26 x10E6/uL See_Comment                [Automate

d



             679-8)                                              message] The sy

stem



                                                                 which generated



                                                                 this result



                                                                 transmitted



                                                                 reference range

:



                                                                 4.14-5.80 x10E6

/uL.



                                                                 The reference r

skye



                                                                 was not used to



                                                                 interpret this



                                                                 result as



                                                                 normal/abnormal

.

 

             Hemoglobin (test code 15.3 g/dL    See_Comment                [Auto

mated



             = 718-7)                                            message] The sy

stem



                                                                 which generated



                                                                 this result



                                                                 transmitted



                                                                 reference range

:



                                                                 13.0-17.7 g/dL.

 The



                                                                 reference range

 was



                                                                 not used to



                                                                 interpret this



                                                                 result as



                                                                 normal/abnormal

.

 

             Hematocrit (test code 44.4 %       See_Comment                [Auto

mated



             = 4544-3)                                           message] The sy

stem



                                                                 which generated



                                                                 this result



                                                                 transmitted



                                                                 reference range

:



                                                                 37.5-51.0 %. Th

e



                                                                 reference range

 was



                                                                 not used to



                                                                 interpret this



                                                                 result as



                                                                 normal/abnormal

.

 

             MCV (test code = 84 fL        See_Comment                [Automated



             787-2)                                              message] The sy

stem



                                                                 which generated



                                                                 this result



                                                                 transmitted



                                                                 reference range

:



                                                                 79-97 fL. The



                                                                 reference range

 was



                                                                 not used to



                                                                 interpret this



                                                                 result as



                                                                 normal/abnormal

.

 

             MCH (test code = 29.1 pg      See_Comment                [Automated



             785-6)                                              message] The sy

stem



                                                                 which generated



                                                                 this result



                                                                 transmitted



                                                                 reference range

:



                                                                 26.6-33.0 pg. T

he



                                                                 reference range

 was



                                                                 not used to



                                                                 interpret this



                                                                 result as



                                                                 normal/abnormal

.

 

             MCHC (test code = 34.5 g/dL    See_Comment                [Automate

d



             786-4)                                              message] The sy

stem



                                                                 which generated



                                                                 this result



                                                                 transmitted



                                                                 reference range

:



                                                                 31.5-35.7 g/dL.

 The



                                                                 reference range

 was



                                                                 not used to



                                                                 interpret this



                                                                 result as



                                                                 normal/abnormal

.

 

             RDW (test code = 13.1 %       See_Comment                [Automated



             788-0)                                              message] The sy

stem



                                                                 which generated



                                                                 this result



                                                                 transmitted



                                                                 reference range

:



                                                                 11.6-15.4 %. Th

e



                                                                 reference range

 was



                                                                 not used to



                                                                 interpret this



                                                                 result as



                                                                 normal/abnormal

.

 

             Platelets (test code 308 x10E3/uL See_Comment                [Autom

ated



             = 777-3)                                            message] The sy

stem



                                                                 which generated



                                                                 this result



                                                                 transmitted



                                                                 reference range

:



                                                                 150-450 x10E3/u

L.



                                                                 The reference r

skye



                                                                 was not used to



                                                                 interpret this



                                                                 result as



                                                                 normal/abnormal

.

 

             Neutrophils (test 77 %         Not Estab. %              



             code = 770-8)                                        

 

             Lymphs (test code = 15 %         Not Estab. %              



             736-9)                                              

 

             Monocytes (test code 6 %          Not Estab. %              



             = 5905-5)                                           

 

             Eos (test code = 0 %          Not Estab. %              



             713-8)                                              

 

             Basos (test code = 1 %          Not Estab. %              



             706-2)                                              

 

             Immature Cells (test                                        



             code = UNLOINC)                                        

 

             Neutrophils  11.6 x10E3/uL See_Comment  H             [Automated



             (Absolute) (test code                                        messag

e] The system



             = 751-8)                                            which generated



                                                                 this result



                                                                 transmitted



                                                                 reference range

:



                                                                 1.4-7.0 x10E3/u

L.



                                                                 The reference r

skye



                                                                 was not used to



                                                                 interpret this



                                                                 result as



                                                                 normal/abnormal

.

 

             Lymphs (Absolute) 2.2 x10E3/uL See_Comment                [Automate

d



             (test code = 731-0)                                        message]

 The system



                                                                 which generated



                                                                 this result



                                                                 transmitted



                                                                 reference range

:



                                                                 0.7-3.1 x10E3/u

L.



                                                                 The reference r

skye



                                                                 was not used to



                                                                 interpret this



                                                                 result as



                                                                 normal/abnormal

.

 

             Monocytes(Absolute) 0.8 x10E3/uL See_Comment                [Automa

moncho



             (test code = 742-7)                                        message]

 The system



                                                                 which generated



                                                                 this result



                                                                 transmitted



                                                                 reference range

:



                                                                 0.1-0.9 x10E3/u

L.



                                                                 The reference r

skye



                                                                 was not used to



                                                                 interpret this



                                                                 result as



                                                                 normal/abnormal

.

 

             Eos (Absolute) (test 0.0 x10E3/uL See_Comment                [Autom

ated



             code = 711-2)                                        message] The s

ystem



                                                                 which generated



                                                                 this result



                                                                 transmitted



                                                                 reference range

:



                                                                 0.0-0.4 x10E3/u

L.



                                                                 The reference r

skye



                                                                 was not used to



                                                                 interpret this



                                                                 result as



                                                                 normal/abnormal

.

 

             Baso (Absolute) (test 0.1 x10E3/uL See_Comment                [Auto

mated



             code = 704-7)                                        message] The s

ystem



                                                                 which generated



                                                                 this result



                                                                 transmitted



                                                                 reference range

:



                                                                 0.0-0.2 x10E3/u

L.



                                                                 The reference r

skye



                                                                 was not used to



                                                                 interpret this



                                                                 result as



                                                                 normal/abnormal

.

 

             Immature Granulocytes 1 %          Not Estab. %              



             (test code = 91099-7)                                        

 

             Immature Grans (Abs) 0.1 x10E3/uL See_Comment                [Autom

ated



             (test code = 60147-4)                                        messag

e] The system



                                                                 which generated



                                                                 this result



                                                                 transmitted



                                                                 reference range

:



                                                                 0.0-0.1 x10E3/u

L.



                                                                 The reference r

skye



                                                                 was not used to



                                                                 interpret this



                                                                 result as



                                                                 normal/abnormal

.

 

             NRBC (test code =                                        



             00763-7)                                            

 

             Hematology Comments:                                        



             (test code = 40187-3)                                        



TSH reflex to [id number] 00:00:00





             Test Item    Value        Reference Range Interpretation Comments

 

             TSH (test code = 0.983 uIU/mL See_Comment                [Automated

 message] The



             00729-9)                                            system which ge

nerated



                                                                 this result tra

nsmitted



                                                                 reference range

:



                                                                 0.450-4.500 uIU

/mL. The



                                                                 reference range

 was not



                                                                 used to interpr

et this



                                                                 result as



                                                                 normal/abnormal

.



Lipid Panel With LDL/HDL [id number] 00:00:00





             Test Item    Value        Reference Range Interpretation Comments

 

             Cholesterol, Total 189 mg/dL    See_Comment                [Automat

ed message]



             (test code = 2093-3)                                        [facility]

tem which



                                                                 generated this 

result



                                                                 transmitted ref

erence



                                                                 range: 100-199 

mg/dL.



                                                                 The reference r

skye



                                                                 was not used to



                                                                 interpret this 

result



                                                                 as normal/abnor

mal.

 

             Triglycerides (test 155 mg/dL    See_Comment  H             [Automa

moncho message]



             code = 2571-8)                                        The system 

ich



                                                                 generated this 

result



                                                                 transmitted ref

erence



                                                                 range: 0-149 mg

/dL.



                                                                 The reference r

skye



                                                                 was not used to



                                                                 interpret this 

result



                                                                 as normal/abnor

mal.

 

             HDL Cholesterol (test 40 mg/dL     See_Comment                [Auto

mated message]



             code = 2085-9)                                        The system [facility]



                                                                 generated this 

result



                                                                 transmitted ref

erence



                                                                 range: >39 mg/d

L. The



                                                                 reference range

 was



                                                                 not used to int

erpret



                                                                 this result as



                                                                 normal/abnormal

.



UA/M w/rflx Culture, [id number] 00:00:00





             Test Item    Value        Reference Range Interpretation Comments

 

             Specific Gravity (test 1.028        1.005-1.030               



             code = 2965-2)                                        

 

             pH (test code = 6.5          5.0-7.5                   



             5803-2)                                             

 

             Urine-Color (test code Yellow       Yellow                    



             = 5778-6)                                           

 

             Appearance (test code Clear        Clear                     



             = 5767-9)                                           

 

             WBC Esterase (test Negative     Negative                  



             code = 5799-2)                                        

 

             Protein (test code = 1+           Negative/Trace A            



             08672-9)                                            

 

             Glucose (test code = Negative     Negative                  



             2349-9)                                             

 

             Ketones (test code = Negative     Negative                  



             8524-8)                                             

 

             Occult Blood (test Negative     Negative                  



             code = 5794-3)                                        

 

             Bilirubin (test code = Negative     Negative                  



             5770-3)                                             

 

             Urobilinogen,Semi-Qn 0.2 mg/dL    See_Comment                [Autom

ated message]



             (test code = 19117-3)                                        The sy

stem which



                                                                 generated this 

result



                                                                 transmitted ref

erence



                                                                 range: 0.2-1.0 

mg/dL.



                                                                 The reference r

skye



                                                                 was not used to



                                                                 interpret this 

result



                                                                 as normal/abnor

mal.

 

             Nitrite, Urine (test Negative     Negative                  



             code = 5802-4)                                        

 

             Microscopic  See below:                             



             Examination (test code                                        



             = 94131-3)                                          

 

             Urinalysis Reflex                                        



             (test code = UNLOINC)                                        



Hemoglobin [id number] 00:00:00





             Test Item    Value        Reference Range Interpretation Comments

 

             Hemoglobin A1c (test 5.1 %        See_Comment                [Autom

ated message] The



             code = 4548-4)                                        system which 

generated



                                                                 this result tra

nsmitted



                                                                 reference range

: 4.8-5.6



                                                                 %. The referenc

e range



                                                                 was not used to



                                                                 interpret this 

result as



                                                                 normal/abnormal

.



Comp. Metabolic Panel (14) ([facility])2022 00:00:00





             Test Item    Value        Reference Range Interpretation Comments

 

             Glucose (test code = 96 mg/dL     See_Comment                [Autom

ated message]



             1685-7)                                             The system [employer]



                                                                 generated this 

result



                                                                 transmitted ref

erence



                                                                 range: 70-99 mg

/dL.



                                                                 The reference r

skye



                                                                 was not used to



                                                                 interpret this 

result



                                                                 as normal/abnor

mal.

 

             BUN (test code = 13 mg/dL     See_Comment                [Automated

 message]



             3094-0)                                             The system [employer]



                                                                 generated this 

result



                                                                 transmitted ref

erence



                                                                 range: 6-20 mg/

dL.



                                                                 The reference r

skye



                                                                 was not used to



                                                                 interpret this 

result



                                                                 as normal/abnor

mal.

 

             Creatinine (test code 0.90 mg/dL   See_Comment                [Auto

mated message]



             = 2160-0)                                           The system [employer]



                                                                 generated this 

result



                                                                 transmitted ref

erence



                                                                 range: 0.76-1.2

7



                                                                 mg/dL. The refe

rence



                                                                 range was not u

sed to



                                                                 interpret this 

result



                                                                 as normal/abnor

mal.

 

             BUN/Creatinine Ratio 14           9-20                      



             (test code = 3097-3)                                        

 

             Sodium (test code = 139 mmol/L   See_Comment                [Automa

moncho message]



             0931-2)                                             The system [employer]



                                                                 generated this 

result



                                                                 transmitted ref

erence



                                                                 range: 134-144



                                                                 mmol/L. The ref

erence



                                                                 range was not u

sed to



                                                                 interpret this 

result



                                                                 as normal/abnor

mal.

 

             Potassium (test code = 4.4 mmol/L   See_Comment                [Aut

omated message]



             3573-3)                                             The system [facility]



                                                                 generated this 

result



                                                                 transmitted ref

erence



                                                                 range: 3.5-5.2



                                                                 mmol/L. The ref

erence



                                                                 range was not u

sed to



                                                                 interpret this 

result



                                                                 as normal/abnor

mal.

 

             Chloride (test code = 99 mmol/L    See_Comment                [Auto

mated message]



             -0)                                             The system [facility]



                                                                 generated this 

result



                                                                 transmitted ref

erence



                                                                 range:  m

mol/L.



                                                                 The reference r

skye



                                                                 was not used to



                                                                 interpret this 

result



                                                                 as normal/abnor

mal.

 

             Carbon Dioxide, Total 25 mmol/L    See_Comment                [Auto

mated message]



             (test code = 2028)                                        [facility]

tem which



                                                                 generated this 

result



                                                                 transmitted ref

erence



                                                                 range: 20-29 mm

ol/L.



                                                                 The reference r

skye



                                                                 was not used to



                                                                 interpret this 

result



                                                                 as normal/abnor

mal.

 

             Calcium (test code = 10.0 mg/dL   See_Comment                [Autom

ated message]



             18451-4)                                            The system [facility]



                                                                 generated this 

result



                                                                 transmitted ref

erence



                                                                 range: 8.7-10.2



                                                                 mg/dL. The refe

rence



                                                                 range was not u

sed to



                                                                 interpret this 

result



                                                                 as normal/abnor

mal.

 

             Protein, Total (test 7.5 g/dL     See_Comment                [Autom

ated message]



             code = 4715-2)                                        The system [facility]



                                                                 generated this 

result



                                                                 transmitted ref

erence



                                                                 range: 6.0-8.5 

g/dL.



                                                                 The reference r

skye



                                                                 was not used to



                                                                 interpret this 

result



                                                                 as normal/abnor

mal.

 

             Albumin (test code = 5.2 g/dL     See_Comment                [Autom

ated message]



             2072-7)                                             The system [facility]



                                                                 generated this 

result



                                                                 transmitted ref

erence



                                                                 range: 4.1-5.2 

g/dL.



                                                                 The reference r

skye



                                                                 was not used to



                                                                 interpret this 

result



                                                                 as normal/abnor

mal.

 

             Globulin, Total (test 2.3 g/dL     See_Comment                [Auto

mated message]



             code = 52223-7)                                        The system [facility]



                                                                 generated this 

result



                                                                 transmitted ref

erence



                                                                 range: 1.5-4.5 

g/dL.



                                                                 The reference r

skye



                                                                 was not used to



                                                                 interpret this 

result



                                                                 as normal/abnor

mal.

 

             A/G Ratio (test code = 2.3          1.2-2.2      H            



             1759-0)                                             

 

             Bilirubin, Total (test 0.6 mg/dL    See_Comment                [Aut

omated message]



             code = -2)                                        The system [facility]



                                                                 generated this 

result



                                                                 transmitted ref

erence



                                                                 range: 0.0-1.2 

mg/dL.



                                                                 The reference r

skye



                                                                 was not used to



                                                                 interpret this 

result



                                                                 as normal/abnor

mal.

 

             Alkaline Phosphatase 84 IU/L      See_Comment                [Autom

ated message]



             (test code = 6768-6)                                        The sys

tem which



                                                                 generated this 

result



                                                                 transmitted ref

erence



                                                                 range:  I

U/L.



                                                                 The reference r

skye



                                                                 was not used to



                                                                 interpret this 

result



                                                                 as normal/abnor

mal.

 

             AST (SGOT) (test code 26 IU/L      See_Comment                [Auto

mated message]



             = 1920-8)                                           The system [facility]



                                                                 generated this 

result



                                                                 transmitted ref

erence



                                                                 range: 0-40 IU/

L. The



                                                                 reference range

 was



                                                                 not used to int

erpret



                                                                 this result as



                                                                 normal/abnormal

.

 

             ALT (SGPT) (test code 31 IU/L      See_Comment                [Auto

mated message]



             = 1742-6)                                           The system [facility]



                                                                 generated this 

result



                                                                 transmitted ref

erence



                                                                 range: 0-44 IU/

L. The



                                                                 reference range

 was



                                                                 not used to int

erpret



                                                                 this result as



                                                                 normal/abnormal

.



CBC With Differential/[id number] 00:00:00





             Test Item    Value        Reference Range Interpretation Comments

 

             WBC (test code = 14.9 x10E3/uL See_Comment  H             [Automate

d



             90-2)                                             message] The sy

stem



                                                                 which generated



                                                                 this result



                                                                 transmitted



                                                                 reference range

:



                                                                 3.4-10.8 x10E3/

uL.



                                                                 The reference r

skye



                                                                 was not used to



                                                                 interpret this



                                                                 result as



                                                                 normal/abnormal

.

 

             RBC (test code = 5.26 x10E6/uL See_Comment                [Automate

d



             739-8)                                              message] The sy

stem



                                                                 which generated



                                                                 this result



                                                                 transmitted



                                                                 reference range

:



                                                                 4.14-5.80 x10E6

/uL.



                                                                 The reference r

skye



                                                                 was not used to



                                                                 interpret this



                                                                 result as



                                                                 normal/abnormal

.

 

             Hemoglobin (test code 15.3 g/dL    See_Comment                [Auto

mated



             = 848-7)                                            message] The sy

stem



                                                                 which generated



                                                                 this result



                                                                 transmitted



                                                                 reference range

:



                                                                 13.0-17.7 g/dL.

 The



                                                                 reference range

 was



                                                                 not used to



                                                                 interpret this



                                                                 result as



                                                                 normal/abnormal

.

 

             Hematocrit (test code 44.4 %       See_Comment                [Auto

mated



             = 4544-3)                                           message] The sy

stem



                                                                 which generated



                                                                 this result



                                                                 transmitted



                                                                 reference range

:



                                                                 37.5-51.0 %. Th

e



                                                                 reference range

 was



                                                                 not used to



                                                                 interpret this



                                                                 result as



                                                                 normal/abnormal

.

 

             MCV (test code = 84 fL        See_Comment                [Automated



             787-2)                                              message] The sy

stem



                                                                 which generated



                                                                 this result



                                                                 transmitted



                                                                 reference range

:



                                                                 79-97 fL. The



                                                                 reference range

 was



                                                                 not used to



                                                                 interpret this



                                                                 result as



                                                                 normal/abnormal

.

 

             MCH (test code = 29.1 pg      See_Comment                [Automated



             785-6)                                              message] The sy

stem



                                                                 which generated



                                                                 this result



                                                                 transmitted



                                                                 reference range

:



                                                                 26.6-33.0 pg. T

he



                                                                 reference range

 was



                                                                 not used to



                                                                 interpret this



                                                                 result as



                                                                 normal/abnormal

.

 

             MCHC (test code = 34.5 g/dL    See_Comment                [Automate

d



             786-4)                                              message] The sy

stem



                                                                 which generated



                                                                 this result



                                                                 transmitted



                                                                 reference range

:



                                                                 31.5-35.7 g/dL.

 The



                                                                 reference range

 was



                                                                 not used to



                                                                 interpret this



                                                                 result as



                                                                 normal/abnormal

.

 

             RDW (test code = 13.1 %       See_Comment                [Automated



             788-0)                                              message] The sy

stem



                                                                 which generated



                                                                 this result



                                                                 transmitted



                                                                 reference range

:



                                                                 11.6-15.4 %. Th

e



                                                                 reference range

 was



                                                                 not used to



                                                                 interpret this



                                                                 result as



                                                                 normal/abnormal

.

 

             Platelets (test code 308 x10E3/uL See_Comment                [Autom

ated



             = 777-3)                                            message] The sy

stem



                                                                 which generated



                                                                 this result



                                                                 transmitted



                                                                 reference range

:



                                                                 150-450 x10E3/u

L.



                                                                 The reference r

skye



                                                                 was not used to



                                                                 interpret this



                                                                 result as



                                                                 normal/abnormal

.

 

             Neutrophils (test 77 %         Not Estab. %              



             code = 770-8)                                        

 

             Lymphs (test code = 15 %         Not Estab. %              



             736-9)                                              

 

             Monocytes (test code 6 %          Not Estab. %              



             = 5905-5)                                           

 

             Eos (test code = 0 %          Not Estab. %              



             713-8)                                              

 

             Basos (test code = 1 %          Not Estab. %              



             706-2)                                              

 

             Immature Cells (test                                        



             code = UNLOINC)                                        

 

             Neutrophils  11.6 x10E3/uL See_Comment  H             [Automated



             (Absolute) (test code                                        messag

e] The system



             = 751-8)                                            which generated



                                                                 this result



                                                                 transmitted



                                                                 reference range

:



                                                                 1.4-7.0 x10E3/u

L.



                                                                 The reference r

skye



                                                                 was not used to



                                                                 interpret this



                                                                 result as



                                                                 normal/abnormal

.

 

             Lymphs (Absolute) 2.2 x10E3/uL See_Comment                [Automate

d



             (test code = 731-0)                                        message]

 The system



                                                                 which generated



                                                                 this result



                                                                 transmitted



                                                                 reference range

:



                                                                 0.7-3.1 x10E3/u

L.



                                                                 The reference r

skye



                                                                 was not used to



                                                                 interpret this



                                                                 result as



                                                                 normal/abnormal

.

 

             Monocytes(Absolute) 0.8 x10E3/uL See_Comment                [Automa

moncho



             (test code = 742-7)                                        message]

 The system



                                                                 which generated



                                                                 this result



                                                                 transmitted



                                                                 reference range

:



                                                                 0.1-0.9 x10E3/u

L.



                                                                 The reference r

skye



                                                                 was not used to



                                                                 interpret this



                                                                 result as



                                                                 normal/abnormal

.

 

             Eos (Absolute) (test 0.0 x10E3/uL See_Comment                [Autom

ated



             code = 711-2)                                        message] The s

ystem



                                                                 which generated



                                                                 this result



                                                                 transmitted



                                                                 reference range

:



                                                                 0.0-0.4 x10E3/u

L.



                                                                 The reference r

skye



                                                                 was not used to



                                                                 interpret this



                                                                 result as



                                                                 normal/abnormal

.

 

             Baso (Absolute) (test 0.1 x10E3/uL See_Comment                [Auto

mated



             code = 704-7)                                        message] The s

ystem



                                                                 which generated



                                                                 this result



                                                                 transmitted



                                                                 reference range

:



                                                                 0.0-0.2 x10E3/u

L.



                                                                 The reference r

skye



                                                                 was not used to



                                                                 interpret this



                                                                 result as



                                                                 normal/abnormal

.

 

             Immature Granulocytes 1 %          Not Estab. %              



             (test code = 74067-5)                                        

 

             Immature Grans (Abs) 0.1 x10E3/uL See_Comment                [Autom

ated



             (test code = 72980-1)                                        messag

e] The system



                                                                 which generated



                                                                 this result



                                                                 transmitted



                                                                 reference range

:



                                                                 0.0-0.1 x10E3/u

L.



                                                                 The reference r

skye



                                                                 was not used to



                                                                 interpret this



                                                                 result as



                                                                 normal/abnormal

.

 

             NRBC (test code =                                        



             47996-1)                                            

 

             Hematology Comments:                                        



             (test code = 38536-3)                                        



TSH reflex to [id number] 00:00:00





             Test Item    Value        Reference Range Interpretation Comments

 

             TSH (test code = 0.983 uIU/mL See_Comment                [Automated

 message] The



             17933-0)                                            system which ge

nerated



                                                                 this result tra

nsmitted



                                                                 reference range

:



                                                                 0.450-4.500 uIU

/mL. The



                                                                 reference range

 was not



                                                                 used to interpr

et this



                                                                 result as



                                                                 normal/abnormal

.



STREP A [id number] 00:00:00





             Test Item    Value        Reference Range Interpretation Comments

 

             Result (test code = 04145-3) NEGATIVE                              

 



STREP A [id number] 00:00:00





             Test Item    Value        Reference Range Interpretation Comments

 

             Result (test code = 50085-3) NEGATIVE                              

 



POC, COVID 19 Antigen + Flu by [facility], COVID 19 Antigen + Flu by [facility], 
COVID 19 Antigen + Flu by [facility], COVID 19 Antigen + Flu by Verna

## 2023-06-01 NOTE — EKG
Test Date:    2023-05-31               Test Time:    15:15:42

Technician:   MB                                     

                                                     

MEASUREMENT RESULTS:                                       

Intervals:                                           

Rate:         85                                     

MT:           122                                    

QRSD:         94                                     

QT:           360                                    

QTc:          428                                    

Axis:                                                

P:            61                                     

MT:           122                                    

QRS:          91                                     

T:            32                                     

                                                     

INTERPRETIVE STATEMENTS:                                       

                                                     

Normal sinus rhythm

Nonspecific ST and T wave abnormality

Abnormal ECG

Compared to ECG 08/30/2012 10:55:23

ST (T wave) deviation now present

Sinus arrhythmia no longer present

Atrial premature complex(es) no longer present



Electronically Signed On 06-01-23 12:06:04 CDT by Conrad Grullon